# Patient Record
Sex: FEMALE | Race: WHITE | Employment: OTHER | URBAN - METROPOLITAN AREA
[De-identification: names, ages, dates, MRNs, and addresses within clinical notes are randomized per-mention and may not be internally consistent; named-entity substitution may affect disease eponyms.]

---

## 2017-01-04 ENCOUNTER — HOSPITAL ENCOUNTER (OUTPATIENT)
Dept: PHYSICAL THERAPY | Facility: MEDICAL CENTER | Age: 47
Discharge: HOME | End: 2017-01-31
Admitting: INTERNAL MEDICINE

## 2017-01-12 ENCOUNTER — OFFICE VISIT (OUTPATIENT)
Dept: ORTHOPEDIC SURGERY | Age: 47
End: 2017-01-12

## 2017-01-12 VITALS — WEIGHT: 105 LBS | HEIGHT: 61 IN | BODY MASS INDEX: 19.83 KG/M2

## 2017-01-12 DIAGNOSIS — S86.112D STRAIN OF GASTROCNEMIUS MUSCLE OF LEFT LOWER EXTREMITY, SUBSEQUENT ENCOUNTER: Primary | ICD-10-CM

## 2017-01-12 PROCEDURE — 99212 OFFICE O/P EST SF 10 MIN: CPT | Performed by: INTERNAL MEDICINE

## 2017-12-04 ENCOUNTER — OFFICE VISIT (OUTPATIENT)
Dept: ORTHOPEDIC SURGERY | Age: 47
End: 2017-12-04

## 2017-12-04 VITALS
WEIGHT: 105 LBS | DIASTOLIC BLOOD PRESSURE: 73 MMHG | HEIGHT: 61 IN | BODY MASS INDEX: 19.83 KG/M2 | SYSTOLIC BLOOD PRESSURE: 115 MMHG

## 2017-12-04 DIAGNOSIS — S86.111A STRAIN OF GASTROCNEMIUS MUSCLE, RIGHT, INITIAL ENCOUNTER: Primary | ICD-10-CM

## 2017-12-04 PROCEDURE — 99213 OFFICE O/P EST LOW 20 MIN: CPT | Performed by: INTERNAL MEDICINE

## 2017-12-04 RX ORDER — TRAMADOL HYDROCHLORIDE 50 MG/1
50 TABLET ORAL EVERY 6 HOURS PRN
Qty: 20 TABLET | Refills: 0 | Status: SHIPPED | OUTPATIENT
Start: 2017-12-04 | End: 2017-12-14

## 2017-12-04 RX ORDER — TIZANIDINE 2 MG/1
2 TABLET ORAL EVERY 8 HOURS PRN
Qty: 20 TABLET | Refills: 1 | Status: SHIPPED | OUTPATIENT
Start: 2017-12-04

## 2017-12-04 NOTE — PROGRESS NOTES
Signs:    There were no vitals filed for this visit. General Exam:     Constitutional: Patient is adequately groomed with no evidence of malnutrition  Mental Status: The patient is oriented to time, place and person. The patient's mood and affect are appropriate. Vascular: Examination reveals no swelling or calf tenderness. Peripheral pulses are palpable and 2+. Lymphatics: no lymphadenopathy of the inguinal region or lower extremity    Physical Exam: right leg      Primary Exam:    Inspection:  Mild asymmetric soft tissue swelling no defect       Palpation:  There is tenderness over the medial gastrocnemius is to myotendinous junction, no palpable defect or rent      Range of Motion:  Symmetric full range of the ankle      Strength:  Submaximal resisted normal without pain-plantar flexion      Special Tests: Miranda test negative      Skin: There are no rashes, ulcerations or lesions. Gait: antalgic      Reflex intact lower     Additional Comments:        Additional Examinations:           Left Lower Extremity: Examination of the left lower extremity does not show any tenderness, deformity or injury. Range of motion is unremarkable. There is no gross instability. There are no rashes, ulcerations or lesions. Strength and tone are normal.  Neurolgic -Light touch sensation and manual muscle testing normal L2-S1. No fasiculations. Pattella tendon and Achilles tendon reflexes +2 bilaterally. Seated SLR negative          Office Imaging Results/Procedures PerformedToday:             Office Procedures:     Orders Placed This Encounter   Procedures    [facility]     Referral Priority:   Routine     Referral Type:   Eval and Treat     Requested Specialty:   Physical Therapy     Number of Visits Requested:   1           Other Outside Imaging and Testing Personally Reviewed:       none          Assessment   Impression: . Encounter Diagnosis   Name Primary?     Strain of gastrocnemius muscle, right, initial encounter Yes              Plan:        Brace boot   Immobilization single crutch assisted ambulation when necessary for symptom control  Local measures. Use of NSAIDs which a precaution  Medical pain management muscle relaxant/tramadol short-term. Formal course of PT and modalities         Orders:        Orders Placed This Encounter   Procedures    [facility]     Referral Priority:   Routine     Referral Type:   Eval and Treat     Requested Specialty:   Physical Therapy     Number of Visits Requested:   1         Isabel Chávez MD.      Rashard Duran: \"This note was dictated with voice recognition software. Though review and correction are routine, we apologize for any errors. \"

## 2017-12-05 ENCOUNTER — HOSPITAL ENCOUNTER (OUTPATIENT)
Dept: PHYSICAL THERAPY | Facility: MEDICAL CENTER | Age: 47
Discharge: HOME | End: 2017-12-31
Admitting: INTERNAL MEDICINE

## 2017-12-05 NOTE — PLAN OF CARE
ability to ambulate prolonged functional periods/distances/surfaces   Reduced ability to ascend/descend stairs   Reduced ability to run, hop or jump    Participation Restrictions   Reduced participation in self care activities   Reduced participation in home management activities   Reduced participation in work activities   Reduced participation in social activities. Reduced participation in sport activities. Classification :    Signs/symptoms consistent with post-surgical status including decreased ROM, strength and function. Signs/symptoms consistent with joint sprain/strain   Signs/symptoms consistent with patella-femoral syndrome   Signs/symptoms consistent with knee OA/hip OA   Signs/symptoms consistent with internal derangement of knee/Hip   Signs/symptoms consistent with functional hip weakness/NMR control      Signs/symptoms consistent with tendinitis/tendinosis    signs/symptoms consistent with pathology which may benefit from Dry needling      other:    Classification :   - Signs/symptoms consistent with post-surgical status including decreased ROM, strength and function.  - Signs/symptoms consistent with functional hip weakness/NMR control   - impaired joint mobility, motor function, muscle performance and range of motion associated with:  - joint arthroplasty. (Pattern 4H)  - bony or soft tissue surgical procedure. (Pattern 4I)  - ligament or other connective tissue dysfunction. (Pattern 4D)  - localized inflammation. (Pattern 4E)  - fracture. (Pattern 4G)  - impaired muscle performance (Pattern 4C)  - Primary Prevention/Risk Reduction for loss of balance and falling.  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   - Modalities as needed including: thermal agents, E-stim, US, iontophoresis as indicated. - Patient education on joint protection, activity modification, progression of HEP. HEP instruction: calf stretches     GOALS:  Patient stated goal: \"to return to 100%\"    Therapist goals for Patient:   Short Term Goals: To be achieved in: 2 weeks  - Independent in HEP and progression per patient tolerance, in order to prevent re-injury. - Patient will have a decrease in pain to facilitate improvement in movement, function, and ADLs as indicated by Functional Deficits. Long Term Goals: To be achieved in: 12 weeks  - The patient is expected to demonstrate less than 10% impairment, limitation or restriction in:  - walking and moving around (mobility)  -changing and maintaining body position  - carrying, moving and handling objects. - Patient will demonstrate increased passive and active ROM to full, symmetrical and painfree to allow for proper joint functioning as indicated by patients Functional Deficits. - Patient will demonstrate an increase in Strength to full and painfree to resistance testing to allow for proper functional mobility as indicated by patients Functional Deficits. - Patient will return to desired, higher level,  functional activities without increased symptoms or restriction.       Electronically signed by:  Eusebio Staff, PT, MPT

## 2017-12-06 NOTE — FLOWSHEET NOTE
[facility]  Orthopaedics and Sports Rehabilitation, [facility]    Physical Therapy Daily Treatment Note  Date:  2017    Patient Name:  Bharath Montana    :  1970  MRN: [id number]  Restrictions/Precautions:    Medical/Treatment Diagnosis Information:  · Diagnosis: left gastroc strain   · Treatment Diagnosis: left leg pain - V05.135  Insurance/Certification information:  PT Insurance Information: [employer]   Physician Information:  Referring Practitioner: dr Trevor Smith of care signed (Y/N):     Date of Patient follow up with Physician:     G-Code (if applicable):      Date G-Code Applied:    PT G-Codes  Functional Assessment Tool Used: lefs   Score: 39%   Functional Limitation: Mobility: Walking and moving around  Mobility: Walking and Moving Around Current Status (): At least 20 percent but less than 40 percent impaired, limited or restricted  Mobility: Walking and Moving Around Goal Status ():  At least 1 percent but less than 20 percent impaired, limited or restricted    Progress Note: [x]  Yes  []  No  Next due by: Visit #10       Latex Allergy:  [x]NO      []YES  Preferred Language for Healthcare:   [x]English       []other:    Visit # Insurance Allowable   1 Pending      Pain level:  /10     SUBJECTIVE:  See eval    OBJECTIVE: See eval  Observation:   Test measurements:      RESTRICTIONS/PRECAUTIONS:     Exercises/Interventions:     Therapeutic Ex Sets/sec Reps Notes   Belt gastroc stretch   6 x 20\"     Belt soleus stretch n  5 x 20\"     Alphabet   2x    Seated LL/LD stretch   5'     Standing slt brd   5 x 20\"           Gait   5'                                                           Manual Intervention      Prom/stm/graston   26'                                                                                                   Therapeutic Exercise and NMR EXR  [x] (63129) Provided verbal/tactile cueing for activities related to strengthening, flexibility, endurance, ROM for improvements in LE, proximal hip, and core control with self care, mobility, lifting, ambulation.  [] (06958) Provided verbal/tactile cueing for activities related to improving balance, coordination, kinesthetic sense, posture, motor skill, proprioception  to assist with LE, proximal hip, and core control in self care, mobility, lifting, ambulation and eccentric single leg control. NMR and Therapeutic Activities:    [x] (88414 or 23875) Provided verbal/tactile cueing for activities related to improving balance, coordination, kinesthetic sense, posture, motor skill, proprioception and motor activation to allow for proper function of core, proximal hip and LE with self care and ADLs  [] (96519) Gait Re-education- Provided training and instruction to the patient for proper LE, core and proximal hip recruitment and positioning and eccentric body weight control with ambulation re-education including up and down stairs     Home Exercise Program:    [x] (51008) Reviewed/Progressed HEP activities related to strengthening, flexibility, endurance, ROM of core, proximal hip and LE for functional self-care, mobility, lifting and ambulation/stair navigation   [] (17962)Reviewed/Progressed HEP activities related to improving balance, coordination, kinesthetic sense, posture, motor skill, proprioception of core, proximal hip and LE for self care, mobility, lifting, and ambulation/stair navigation      Manual Treatments:  PROM / STM / Oscillations-Mobs:  G-I, II, III, IV (PA's, Inf., Post.)  [x] (80135) Provided manual therapy to mobilize LE, proximal hip and/or LS spine soft tissue/joints for the purpose of modulating pain, promoting relaxation,  increasing ROM, reducing/eliminating soft tissue swelling/inflammation/restriction, improving soft tissue extensibility and allowing for proper ROM for normal function with self care, mobility, lifting and ambulation.      Modalities:    Egs/hp   Charges:  Timed Code Treatment

## 2018-01-01 ENCOUNTER — HOSPITAL ENCOUNTER (OUTPATIENT)
Dept: OTHER | Age: 48
Discharge: HOME | End: 2018-01-31
Attending: INTERNAL MEDICINE | Admitting: INTERNAL MEDICINE